# Patient Record
Sex: FEMALE | Race: WHITE | URBAN - NONMETROPOLITAN AREA
[De-identification: names, ages, dates, MRNs, and addresses within clinical notes are randomized per-mention and may not be internally consistent; named-entity substitution may affect disease eponyms.]

---

## 2023-11-20 ENCOUNTER — HOSPITAL ENCOUNTER (OUTPATIENT)
Dept: PHYSICAL THERAPY | Age: 58
Setting detail: THERAPIES SERIES
Discharge: HOME | End: 2023-11-20
Payer: COMMERCIAL

## 2023-11-20 PROCEDURE — 97530 THERAPEUTIC ACTIVITIES: CPT

## 2023-11-20 PROCEDURE — 97166 OT EVAL MOD COMPLEX 45 MIN: CPT

## 2023-11-20 PROCEDURE — 97110 THERAPEUTIC EXERCISES: CPT

## 2023-11-20 NOTE — PROGRESS NOTES
** PLEASE SIGN, DATE AND TIME CERTIFICATION BELOW AND RETURN TO [facility] OUTPATIENT REHABILITATION (FAX #: [phone]). ATTEST/CO-SIGN IF ACCESSING VIA [facility]. THANK YOU.**    I certify that I have examined the patient below and determined that Physical Medicine and Rehabilitation service is necessary and that I approve the established plan of care for up to 90 days or as specifically noted.   Attestation, signature or co-signature of physician indicates approval of certification requirements.    ________________________ ____________ __________  Physician Signature   Date   Time  [facility] - SPECIALIZED THERAPY SERVICES  [x] PELVIC HEALTH EVALUATION  [] DAILY NOTE  [] PROGRESS NOTE [] DISCHARGE NOTE    Date: 2023  Patient Name:  Messi Bhardwajr  : 1965  MRN: [id number]  CSN: [id number]    Referring Practitioner Jassi Vaughan MD   Diagnosis Segmental and somatic dysfunction of pelvic region   Treatment Diagnosis N81.84 - Pelvic Muscle Wasting (Female), R39.15 - Urgency of Urination  R35.0 - Frequency of Micturition  N39.46 - Mixed Incontinence  R39.14 - Feeling of Incomplete Bladder Emptying, K59.00 - Constipation, Unspecified, R10.2 - Pelvic and Perineal Pain  R10.30 - Lower Abdominal Pain, Unspecified  R94.10 - Unspecified Dyspareunia, and R27.8 - Other Lack of Coordination   Date of Evaluation 23    Additional Pertinent History Colonoscopy  Anorectal manometry    Total hysterectomy        (unsure at this time of the date)   Appendectomy   1986  Gallbladder removal        Rotator cuff  Link heart monitor   Carotid artery plaque removal       Functional Outcome Measure Used PFDI-20   Functional Outcome Score PFDI-20: 28 (23)       Insurance: Primary: Payor: / ,   Secondary:    Authorization Information: No pre cert    Visit # ,  for progress note   Visits Allowed: Calendar year visit based

## 2023-12-04 ENCOUNTER — HOSPITAL ENCOUNTER (OUTPATIENT)
Dept: PHYSICAL THERAPY | Age: 58
Setting detail: THERAPIES SERIES
Discharge: HOME | End: 2023-12-04
Payer: COMMERCIAL

## 2023-12-04 PROCEDURE — 97530 THERAPEUTIC ACTIVITIES: CPT

## 2023-12-04 PROCEDURE — 97140 MANUAL THERAPY 1/> REGIONS: CPT

## 2023-12-04 NOTE — PROGRESS NOTES
[facility]  OCCUPATIONAL THERAPY  OUTPATIENT REHABILITATION - SPECIALIZED THERAPY SERVICES  [] PELVIC HEALTH EVALUATION  [x] DAILY NOTE  [] PROGRESS NOTE [] DISCHARGE NOTE    Date: 2023  Patient Name:  May Arias  : 1965  MRN: [id number]  [facility]: [id number]    Referring Practitioner Linda Rivera MD   Diagnosis  Segmental and somatic dysfunction of pelvic region   Treatment Diagnosis N81.84 - Pelvic Muscle Wasting (Female), R39.15 - Urgency of Urination  R35.0 - Frequency of Micturition  N39.46 - Mixed Incontinence  R39.14 - Feeling of Incomplete Bladder Emptying, K59.00 - Constipation, Unspecified, R10.2 - Pelvic and Perineal Pain  R10.30 - Lower Abdominal Pain, Unspecified  R94.10 - Unspecified Dyspareunia, and R27.8 - Other Lack of Coordination   Date of Evaluation 23    Additional Pertinent History Colonoscopy  Anorectal manometry    Total hysterectomy        (unsure at this time of the date)   Appendectomy   1986  Gallbladder removal        Rotator cuff  Link heart monitor   Carotid artery plaque removal       Functional Outcome Measure Used PFDI-20   Functional Outcome Score PFDI-20: 28 (23)       Insurance: Primary: Payor: Yeni Salas /  /  / ,   Secondary:    Authorization Information: No pre cert    Visit # 2,  for progress note   Visits Allowed: Calendar year visit based on medical necessity    Recertification Date: 2024   Physician Follow-Up: Not at this time   Physician Orders: Eval and treat   History of Present Illness: Pt reports to skilled OT services with c/o pain and GI issues such as hemorrhoids and narrow stools. Pt reports that she does chiropractic. Pt reports that she has also lost 60 pounds which seems to help. 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  Pt reports that she deals with constipation with pencil stools that has been for the last

## 2024-02-05 NOTE — DISCHARGE SUMMARY
[facility]  OUTPATIENT OCCUPATIONAL THERAPY QUICK DISCHARGE NOTE  Specialized Therapy Services    Date: 2024  Patient Name: Julita Singleton        CSN: [id number]   : 1965  (58 y.o.)  Gender: female   Mele Montanez MD,    Fecal Incontinence,      Patient is discharged from Occupational Therapy services at this time.  See last note for details related to results of therapy and goal achievement.    Reason for discharge: Insurance Issues. Pt called and requested to be dc at this time due to changing insurance.       Mary Wade MOT, OTR/L [id number]

## 2024-02-26 ENCOUNTER — HOSPITAL ENCOUNTER (OUTPATIENT)
Dept: PHYSICAL THERAPY | Age: 59
Setting detail: THERAPIES SERIES
Discharge: HOME | End: 2024-02-26

## 2024-09-09 ENCOUNTER — HOSPITAL ENCOUNTER (OUTPATIENT)
Dept: AUDIOLOGY | Age: 59
Discharge: HOME | End: 2024-09-09
Payer: COMMERCIAL

## 2024-09-09 PROCEDURE — 92567 TYMPANOMETRY: CPT | Performed by: AUDIOLOGIST

## 2024-09-09 PROCEDURE — 92540 BASIC VESTIBULAR EVALUATION: CPT | Performed by: AUDIOLOGIST

## 2024-09-09 PROCEDURE — 92537 CALORIC VSTBLR TEST W/REC: CPT | Performed by: AUDIOLOGIST
